# Patient Record
Sex: FEMALE | Race: WHITE | NOT HISPANIC OR LATINO | URBAN - METROPOLITAN AREA
[De-identification: names, ages, dates, MRNs, and addresses within clinical notes are randomized per-mention and may not be internally consistent; named-entity substitution may affect disease eponyms.]

---

## 2024-09-16 PROBLEM — Z00.00 ENCOUNTER FOR PREVENTIVE HEALTH EXAMINATION: Status: ACTIVE | Noted: 2024-09-16

## 2024-10-08 DIAGNOSIS — Q25.0 PATENT DUCTUS ARTERIOSUS: ICD-10-CM

## 2024-10-09 ENCOUNTER — OUTPATIENT (OUTPATIENT)
Dept: OUTPATIENT SERVICES | Age: 19
LOS: 1 days | End: 2024-10-09

## 2024-10-09 ENCOUNTER — APPOINTMENT (OUTPATIENT)
Dept: PEDIATRIC CARDIOLOGY | Facility: CLINIC | Age: 19
End: 2024-10-09
Payer: MEDICAID

## 2024-10-09 ENCOUNTER — NON-APPOINTMENT (OUTPATIENT)
Age: 19
End: 2024-10-09

## 2024-10-09 VITALS
DIASTOLIC BLOOD PRESSURE: 69 MMHG | HEART RATE: 82 BPM | BODY MASS INDEX: 24.89 KG/M2 | OXYGEN SATURATION: 100 % | SYSTOLIC BLOOD PRESSURE: 105 MMHG | HEIGHT: 61.02 IN | WEIGHT: 131.84 LBS

## 2024-10-09 VITALS
DIASTOLIC BLOOD PRESSURE: 74 MMHG | HEART RATE: 87 BPM | HEIGHT: 60.83 IN | WEIGHT: 132.5 LBS | RESPIRATION RATE: 18 BRPM | OXYGEN SATURATION: 100 % | TEMPERATURE: 98 F | SYSTOLIC BLOOD PRESSURE: 114 MMHG

## 2024-10-09 DIAGNOSIS — Q25.0 PATENT DUCTUS ARTERIOSUS: ICD-10-CM

## 2024-10-09 DIAGNOSIS — Z13.6 ENCOUNTER FOR SCREENING FOR CARDIOVASCULAR DISORDERS: ICD-10-CM

## 2024-10-09 LAB
ANION GAP SERPL CALC-SCNC: 13 MMOL/L — SIGNIFICANT CHANGE UP (ref 7–14)
BLD GP AB SCN SERPL QL: NEGATIVE — SIGNIFICANT CHANGE UP
BUN SERPL-MCNC: 14 MG/DL — SIGNIFICANT CHANGE UP (ref 7–23)
CALCIUM SERPL-MCNC: 9.8 MG/DL — SIGNIFICANT CHANGE UP (ref 8.4–10.5)
CHLORIDE SERPL-SCNC: 105 MMOL/L — SIGNIFICANT CHANGE UP (ref 98–107)
CO2 SERPL-SCNC: 24 MMOL/L — SIGNIFICANT CHANGE UP (ref 22–31)
CREAT SERPL-MCNC: 0.73 MG/DL — SIGNIFICANT CHANGE UP (ref 0.5–1.3)
EGFR: 121 ML/MIN/1.73M2 — SIGNIFICANT CHANGE UP
GLUCOSE SERPL-MCNC: 68 MG/DL — LOW (ref 70–99)
HCG SERPL-ACNC: <1 MIU/ML — SIGNIFICANT CHANGE UP
HCT VFR BLD CALC: 38.1 % — SIGNIFICANT CHANGE UP (ref 34.5–45)
HGB BLD-MCNC: 12.8 G/DL — SIGNIFICANT CHANGE UP (ref 11.5–15.5)
MCHC RBC-ENTMCNC: 30.3 PG — SIGNIFICANT CHANGE UP (ref 27–34)
MCHC RBC-ENTMCNC: 33.6 GM/DL — SIGNIFICANT CHANGE UP (ref 32–36)
MCV RBC AUTO: 90.3 FL — SIGNIFICANT CHANGE UP (ref 80–100)
NRBC # BLD: 0 /100 WBCS — SIGNIFICANT CHANGE UP (ref 0–0)
NRBC # FLD: 0 K/UL — SIGNIFICANT CHANGE UP (ref 0–0)
PLATELET # BLD AUTO: 355 K/UL — SIGNIFICANT CHANGE UP (ref 150–400)
POTASSIUM SERPL-MCNC: 3.9 MMOL/L — SIGNIFICANT CHANGE UP (ref 3.5–5.3)
POTASSIUM SERPL-SCNC: 3.9 MMOL/L — SIGNIFICANT CHANGE UP (ref 3.5–5.3)
RBC # BLD: 4.22 M/UL — SIGNIFICANT CHANGE UP (ref 3.8–5.2)
RBC # FLD: 13.7 % — SIGNIFICANT CHANGE UP (ref 10.3–14.5)
RH IG SCN BLD-IMP: POSITIVE — SIGNIFICANT CHANGE UP
SODIUM SERPL-SCNC: 142 MMOL/L — SIGNIFICANT CHANGE UP (ref 135–145)
WBC # BLD: 5.29 K/UL — SIGNIFICANT CHANGE UP (ref 3.8–10.5)
WBC # FLD AUTO: 5.29 K/UL — SIGNIFICANT CHANGE UP (ref 3.8–10.5)

## 2024-10-09 PROCEDURE — 93303 ECHO TRANSTHORACIC: CPT

## 2024-10-09 PROCEDURE — 99204 OFFICE O/P NEW MOD 45 MIN: CPT | Mod: 25

## 2024-10-09 PROCEDURE — 93000 ELECTROCARDIOGRAM COMPLETE: CPT

## 2024-10-09 PROCEDURE — 93320 DOPPLER ECHO COMPLETE: CPT

## 2024-10-09 PROCEDURE — 93325 DOPPLER ECHO COLOR FLOW MAPG: CPT

## 2024-10-09 NOTE — H&P PST ADULT - ASSESSMENT
20 y/o presents to PST without any evidence of  acute illness or infection.  Informed parent to notify Dr. Falk if pt. develops any illness prior to dos.   CHG wipes provided at [facility].  18 y/o presents to PST without any evidence of  acute illness or infection.  Informed parent to notify Dr. Falk if pt. develops any illness prior to dos.   CHG wipes provided at PST.   Case cancellation noted following PST visit, booking slip indicates no longer indicated.

## 2024-10-09 NOTE — H&P PST ADULT - REASON FOR ADMISSION
PST evaluation in preparation for a cardiac catheterization PST evaluation in preparation for a cardiac catheterization on 10/10/24 with Dr. Falk at [facility].

## 2024-10-09 NOTE — H&P PST ADULT - HISTORY OF PRESENT ILLNESS
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  Mom reports pt. was noted to have a heart murmur 5 years ago and has been followed by Dr. Mena.  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  Pt. is now scheduled for a cardiac catheterization on 10/10/24 with Dr. Falk at [facility] [place].    Denies any PSH or exposure to anesthesia.

## 2024-10-09 NOTE — H&P PST ADULT - PROBLEM SELECTOR PLAN 1
Scheduled for a cardiac catheterization on 10/10/24 with Dr. Falk at [facility]. Case cancellation noted following PST visit, booking slip indicates no longer indicated.

## 2024-10-09 NOTE — H&P PST ADULT - COMMENTS
Attends [employer], [employer].   FM[facility]:  21 y/o brother: No PMH, No PSH  10 y/o brother: No PMH, No PSH  3 y/o brother: No PMH, No PSH  Mother: H/o epidurals with deliveries, uncomplicated, No PSH  Father: H/o hernia repair, No PMH  MGM: H/o knee surgery  MGF: Unknown   PGM: HTN, No PSH  PGF: DM, No PSH

## 2024-10-09 NOTE — H&P PST ADULT - CARDIOVASCULAR COMMENTS
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  Approximately 5 years ago PCP noted a heart murmur. 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  Approximately 5 years ago PCP noted a heart murmur. and since then has been following with Dr. Mena.  She was last seen on  8/22/24.  She was noted to have a small PDA with left to right shunting and mild pulmonary valve obstruction. EKG, NSR, WNL. +systolic 2/6 murmur.

## 2024-10-09 NOTE — H&P PST ADULT - NSANTHOSAYNRD_GEN_A_CORE
No. TOY screening performed.  STOP BANG Legend: 0-2 = LOW Risk; 3-4 = INTERMEDIATE Risk; 5-8 = HIGH Risk

## 2024-10-09 NOTE — H&P PST ADULT - SKIN COMMENTS
Brown macular lesion, noted on left hand, right lower back and left knee. Hyperpigmented area noted on right knee.

## 2024-10-10 PROBLEM — Z13.6 SCREENING FOR CARDIOVASCULAR CONDITION: Status: ACTIVE | Noted: 2024-10-10
